# Patient Record
Sex: FEMALE | Race: WHITE | NOT HISPANIC OR LATINO | URBAN - METROPOLITAN AREA
[De-identification: names, ages, dates, MRNs, and addresses within clinical notes are randomized per-mention and may not be internally consistent; named-entity substitution may affect disease eponyms.]

---

## 2021-06-16 ENCOUNTER — OFFICE VISIT - RIVER FALLS (OUTPATIENT)
Dept: FAMILY MEDICINE | Facility: CLINIC | Age: 86
End: 2021-06-16

## 2022-02-15 NOTE — PROGRESS NOTES
History of Present Illness       New nursing home admit at [facility].       Patient seen June 16       Patient underwent right hip replacement and was discharged June 12 to the [facility].  She is undergoing physical therapy.  She expects in the next few weeks to be able to return to an assisted living apartment in [place].       Patient does have significant peripheral vascular disease with carotid stenosis and history of TIA she is on Eliquis and aspirin.       She has had colitis and is on Entocort without significant change in dosing of the 3 mg delayed release capsules daily this was scheduled for the next 4 weeks.       She also takes prednisone 5 mg a day       Her initial PT has been very positive with good progress being made       Patient expresses her main concern of returning to her apartment would be at night when she needs help shorten the bathroom and get up safely       Patient is her own decision making  Review of Systems       Denies significant pain but has been taking oxycodone once or twice per day       No shortness of breath, no diarrhea, no lightheaded  Physical Exam       Patient was sleeping but easily aroused quite alert normal cognition       She displayed good strength with adjustment in bed       Respirations are nonlabored       There is 1+ edema of the lower extremities  Assessment/Plan       1. Atrial fibrillation (I48.91)          He is anticoagulated good rate control not symptomatic       2. Colitis (K52.9)          On Entocort has not seen any exacerbation since her surgery       3. 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 Scleroderma (M34.9)          Not an active issue for her       5. Carotid artery disease (I77.9)          Shot appropriate risk control strategies  Patient Information     Name:JOVANY DAVIES      Address:      [address]      [place], [place] [id number]     Sex:Female      YOB: 1935     Phone:[phone]     MRN:[id number]     FIN:[id number]     Location:[facility]     Date of Service:06/16/2021      Primary Care Physician:       NONE ,       Attending Physician:       Adrian Osman MD, [phone]  Problem List/Past Medical History    Ongoing     No qualifying data    Historical     No qualifying data  Medications    allopurinol 100 mg oral tablet, 100 mg= 1 tab(s), Oral, daily, PRN    aspirin 81 mg oral tablet, chewable, 81 mg= 1 tab(s), Chewed, daily    Crestor 5 mg oral tablet, 5 mg= 1 tab(s), Oral, daily    Eliquis 2.5 mg oral tablet, 2.5 mg= 1 tab(s), Oral, bid    Entocort EC 3 mg oral delayed release capsule, 9 mg= 3 cap(s), Oral, daily    Lasix 20 mg oral tablet, 20 mg= 1 tab(s), Oral, daily    loperamide, 2 mg, Oral, q8 hrs, PRN    oxyCODONE 5 mg oral capsule, 5 mg= 1 cap(s), Oral, q4 hrs, PRN    potassium chloride 20 mEq oral tablet, extended release, 20 mEq= 1 tab(s), Oral, bid    predniSONE 5 mg oral tablet, 5 mg= 1 tab(s), Oral, daily    Protonix 40 mg oral delayed release tablet, 40 mg= 1 tab(s), Oral, bid, PRN    Reglan 5 mg oral tablet, 5 mg= 1 tab(s), Oral, qidachs, PRN    senna 8.6 mg oral tablet, 17.2 mg= 2 tab(s), Oral, bid, PRN    Tylenol Extra Strength 500 mg oral tablet, 1000 mg= 2 tab(s), Oral, q6 hrs, PRN    vancomycin, 125 mg, Oral, qid    Vitamin D3 1000 intl units oral tablet, 1000 mcg, Oral, daily    Zofran 4 mg oral tablet, 4 mg= 1 tab(s), Oral, q8 hrs, PRN  Allergies   No active allergies  Immunizations      No Immunizations recorded for patient.